# Patient Record
(demographics unavailable — no encounter records)

---

## 2024-10-14 NOTE — HISTORY OF PRESENT ILLNESS
[de-identified] : Patient with h/o ETD, rhinitis, bilateral otitis media, and adenotonsillar hypertrophy presents today with Mom and Dad for a follow up. Mom states that he has been using saline nasal spray and Flonase. Mom states that most of the time he breathes through his mouth and snores. Mom denies apneic episodes. Mom denies that he is tired throughout the day. Mom denies that he tosses and turns at night.

## 2024-10-14 NOTE — ASSESSMENT
[FreeTextEntry1] : Reviewed and reconciled medications, allergies, PMHx, PSHx, SocHx, FMHx.  Patient with h/o ETD, rhinitis, bilateral otitis media, and adenotonsillar hypertrophy presents today with Mom and Dad for a follow up. Mom states that he has been using saline nasal spray and Flonase. Mom states that most of the time he breathes through his mouth and snores. Mom denies apneic episodes. Mom denies that he is tired throughout the day. Mom denies that he tosses and turns at night.   Physical exam: -TM retracted bilaterally -enlarged inferior turbinates bilaterally -large tonsils  Audio 10/14/24: -Type C Tymps AU -responded to speech and tonal stimuli (500 Hz) at slightly elevated levels in at least the better ear -right TPP: -226 -left TPP: -293  Plan:  Audio - results interpreted by Dr. Malave and reviewed with the patient. -Continue Flonase - 1 spray bilaterally QD, spray laterally -Ordered sleep study -Discussed tonsillectomy and adenoidectomy pending results from sleep study. Possible complications including but not limited to infection, pain, bleeding, complications from anesthesia, and need for further surgery were discussed. All questions were answered. -Discussed submucous resection of bilateral inferior turbinates pending results from sleep study. R/b/a discussed. Risk of turbinate surgery were discussed with the patient's Mom and Dad including but not limited to bleeding, infection, septal perforation, empty nose syndrome, numbness of the front two teeth, and loss of sense of smell.  All questions were answered. -FU in 2 months

## 2024-10-14 NOTE — ADDENDUM
[FreeTextEntry1] :  Documented by Bony Hobbs acting as scribe for Dr. Malave on 10/14/2024. All Medical record entries made by the Scribe were at my, Dr. Malave, direction and personally dictated by me on 10/14/2024 . I have reviewed the chart and agree that the record accurately reflects my personal performance of the history, physical exam, assessment and plan. I have also personally directed, reviewed, and agreed with the discharge instructions.

## 2024-10-14 NOTE — CONSULT LETTER
[Dear  ___] : Dear  [unfilled], [Courtesy Letter:] : I had the pleasure of seeing your patient, [unfilled], in my office today. [Please see my note below.] : Please see my note below. [Consult Closing:] : Thank you very much for allowing me to participate in the care of this patient.  If you have any questions, please do not hesitate to contact me. [Sincerely,] : Sincerely, [FreeTextEntry3] :  Mychal Malave MD FACS

## 2024-10-14 NOTE — DATA REVIEWED
[FreeTextEntry1] : Audio 10/14/24: -Type C Tymps AU -responded to speech and tonal stimuli (500 Hz) at slightly elevated levels in at least the better ear -right TPP: -226 -left TPP: -293

## 2024-10-14 NOTE — PHYSICAL EXAM
[Normal] : normal [de-identified] : retracted [de-identified] : retracted [de-identified] : enlarged inferior turbinates [de-identified] : enlarged inferior turbinates [de-identified] : large tonsils

## 2024-12-16 NOTE — ADDENDUM
[FreeTextEntry1] :  Documented by Bony Hobbs acting as scribe for Dr. Malave on 12/16/2024. All Medical record entries made by the Scribe were at my, Dr. Malave, direction and personally dictated by me on 12/16/2024 . I have reviewed the chart and agree that the record accurately reflects my personal performance of the history, physical exam, assessment and plan. I have also personally directed, reviewed, and agreed with the discharge instructions.

## 2024-12-16 NOTE — PHYSICAL EXAM
[Normal] : normal [de-identified] : loud, heavy breathing [de-identified] : fluid in the middle ear [de-identified] : fluid in the middle ear [de-identified] : inflamed and swollen turbinates [de-identified] : inflamed and swollen turbinates [de-identified] : not cooperative to oral exam

## 2024-12-16 NOTE — ASSESSMENT
[FreeTextEntry1] : Reviewed and reconciled medications, allergies, PMHx, PSHx, SocHx, FMHx.  Patient with h/o ETD, rhinitis, bilateral otitis media, bilateral inferior turbinate hypertrophy, loud snoring, and adenotonsillar hypertrophy presents today with Dad for a follow up. Dad states that he is always breathing loudly through his mouth. Dad states that he uses Flonase nasal spray. Dad states that he has a sleep study scheduled for 2/4/24. Dad denies witnessed pauses in his breathing. Dad states that he takes naps during the day. Dad denies that he pulls at his ears. Dad states that he had a fever on Thursday.  Audio 10/14/24: -Type C Tymps AU -responded to speech and tonal stimuli (500 Hz) at slightly elevated levels in at least the better ear -right TPP: -226 -left TPP: -293  Physical exam: -fluid in the middle ear bilaterally -inflamed and swollen turbinates bilaterally -not cooperative to oral exam -loud, heavy breathing  Audio 12/16/24: -tymps type B AU -conductive hearing loss  Plan: Audio - results interpreted by Dr. Malave and reviewed with the patient. -Pending results from sleep study R/B/A of bilaterally myringotomy with tube placement, tonsillectomy, and adenoidectomy with possible SMR turbs discussed with dad. All questions answered.

## 2025-03-12 NOTE — HISTORY OF PRESENT ILLNESS
[de-identified] : Patient with h/o ETD, rhinitis, recurrent bilateral otitis media, bilateral inferior turbinate hypertrophy, loud snoring, heavy breathing, and adenotonsillar hypertrophy presents today with Mom and Dad to discuss results from sleep study. Mom states that he is still mouth breathing. Mom states that he currently has nasal congestion with a runny nose and a cough.

## 2025-03-12 NOTE — PHYSICAL EXAM
[Normal] : normal [4+] : 4+ [de-identified] : fluid filled middle ear [de-identified] : enlarged and swollen turbinates [de-identified] : enlarged and swollen turbinates

## 2025-03-12 NOTE — PHYSICAL EXAM
[Normal] : normal [4+] : 4+ [de-identified] : fluid filled middle ear [de-identified] : enlarged and swollen turbinates [de-identified] : enlarged and swollen turbinates

## 2025-03-12 NOTE — HISTORY OF PRESENT ILLNESS
[de-identified] : Patient with h/o ETD, rhinitis, recurrent bilateral otitis media, bilateral inferior turbinate hypertrophy, loud snoring, heavy breathing, and adenotonsillar hypertrophy presents today with Mom and Dad to discuss results from sleep study. Mom states that he is still mouth breathing. Mom states that he currently has nasal congestion with a runny nose and a cough.

## 2025-03-12 NOTE — ASSESSMENT
[FreeTextEntry1] : Reviewed and reconciled medications, allergies, PMHx, PSHx, SocHx, FMHx.  Patient with h/o ETD, rhinitis, recurrent bilateral otitis media, bilateral inferior turbinate hypertrophy, loud snoring, heavy breathing, and adenotonsillar hypertrophy presents today with Mom and Dad to discuss results from sleep study. Mom states that he is still mouth breathing. Mom states that he currently has nasal congestion with a runny nose and a cough.  Sleep study 2/4/25: -total AHI 2.4 - mild sleep apnea -Obstructive Apnea Index 0.2 -oxygen dropped to 94%  Physical exam: -fluid filled left middle ear -enlarged and swollen turbinates bilaterally -class 4 tonsils  Plan: Sleep study reviewed and discussed with the patient's Mom and Dad. -Discussed tonsillectomy or tonsillotomy and adenoidectomy. Possible complications including but not limited to infection, pain, bleeding, complications from anesthesia, and need for further surgery were discussed. All questions were answered. -Discussed submucous resection of bilateral inferior turbinates. R/b/a discussed. Risk of turbinate surgery were discussed with the patient's Mom and Dad including but not limited to bleeding, infection, septal perforation, empty nose syndrome, numbness of the front two teeth, and loss of sense of smell.  All questions were answered. -left tympanostomy tubes R/b/a discussed. Risks include but are not limited to bleeding, infection, otorrhea, early extrusion of tubes, persistent tympanic membrane perforation, worsening of hearing, need for future surgery. All questions were answered. -Referred to Dr. Mar for surgery -FU as needed

## 2025-03-12 NOTE — DATA REVIEWED
[FreeTextEntry1] : Sleep study 2/4/25: -total AHI 2.4 - mild sleep apnea -Obstructive Apnea Index 0.2 -oxygen dropped to 94%

## 2025-03-12 NOTE — ADDENDUM
[FreeTextEntry1] :  Documented by Bony Hobbs acting as scribe for Dr. Malave on 03/12/2025. All Medical record entries made by the Scribe were at my, Dr. Malave, direction and personally dictated by me on 03/12/2025 . I have reviewed the chart and agree that the record accurately reflects my personal performance of the history, physical exam, assessment and plan. I have also personally directed, reviewed, and agreed with the discharge instructions.

## 2025-03-26 NOTE — CONSULT LETTER
[Dear  ___] : Dear  [unfilled], [Consult Letter:] : I had the pleasure of evaluating your patient, [unfilled]. [Please see my note below.] : Please see my note below. [Consult Closing:] : Thank you very much for allowing me to participate in the care of this patient.  If you have any questions, please do not hesitate to contact me. [Sincerely,] : Sincerely, [FreeTextEntry3] : Brdigett Contreras MD\par  Pediatric Dermatology\par  [facility]\par

## 2025-03-26 NOTE — CONSULT LETTER
[Dear  ___] : Dear  [unfilled], [Consult Letter:] : I had the pleasure of evaluating your patient, [unfilled]. [Please see my note below.] : Please see my note below. [Consult Closing:] : Thank you very much for allowing me to participate in the care of this patient.  If you have any questions, please do not hesitate to contact me. [Sincerely,] : Sincerely, [FreeTextEntry3] : Bridgett Contreras MD\par  Pediatric Dermatology\par  [facility]\par

## 2025-03-26 NOTE — HISTORY OF PRESENT ILLNESS
[FreeTextEntry1] : fu eczema [de-identified] : DEMETRIUS JOHN  is a 1yo M who presents for f/u eczema, continuing to flare despite near daily use of triamcinolone to trunk/extremities. Tried eucrisa a few times and felt it helped. Mom inquiring about dupixent previously with groin rash iso desitin use, mom notes this has resolved  Soap: Dove eczema   Moisturizer: CeraVe, Vaseline, shea butter Detergent: Tide F&C - only for patient, not family members

## 2025-03-26 NOTE — ASSESSMENT
[FreeTextEntry1] : # Atopic dermatitis, chronic, flare # Xerosis Discussed dry skincare including avoidance of fragranced detergents for everyone in the household, switching from Dove eczema wash to Dove sensitive bar Mom interested in starting dupixent START dupixent 200mg q 4 weeks (pt weight 23 lb) - mom feels comfortable injecting at home as she is a nurse - Discussed r/b/a and side effects including but not limited to injection site reactions, hypersensitivity, ocular SEs May use buzzy the bee toy as distractor for injections -Leave out overnight to ensure room temperature at time of injection Start clobetasol 0.05% ointment BID to thicker areas on extremities for up to 2w at a time. SED including atrophy, dyspigmentation, telangiectasias, striae. Proper use reviewed including only using to affected area and avoidance of prolonged use. C/w eucrisa anywhere on the body for maintenance May use triamcinolone for a few days at a time only as needed on the face   RTC 3 mos, may start dupi at home prior to this

## 2025-03-26 NOTE — HISTORY OF PRESENT ILLNESS
[FreeTextEntry1] : fu eczema [de-identified] : DEMETRIUS JOHN  is a 3yo M who presents for f/u eczema, continuing to flare despite near daily use of triamcinolone to trunk/extremities. Tried eucrisa a few times and felt it helped. Mom inquiring about dupixent previously with groin rash iso desitin use, mom notes this has resolved  Soap: Dove eczema   Moisturizer: CeraVe, Vaseline, shea butter Detergent: Tide F&C - only for patient, not family members

## 2025-03-26 NOTE — PHYSICAL EXAM
[FreeTextEntry3] : eczematous plaques scattered on trunk and extremities with lichenified plaques on wrists, dorsal hands, ankles xerosis cutis

## 2025-04-03 NOTE — REVIEW OF SYSTEMS
[Negative] : Heme/Lymph [de-identified] : As per HPI [de-identified] : As per HPI [de-identified] : As per HPI

## 2025-04-03 NOTE — HISTORY OF PRESENT ILLNESS
[No Personal or Family History of Easy Bruising, Bleeding, or Issues with General Anesthesia] : No Personal or Family History of easy bruising, bleeding, or issues with general anesthesia [de-identified] : Today I had the pleasure of seeing DEMETRIUS JOHN. DEMETRIUS is a 2 year boy who presents for: ETD and ED  History was obtained from patient, parent and chart.  Referred by Dr. Malave  PSG 2/4/2025 mild ED. Sleep efficiency 95.3%, oAHI 1.9, REM oAHI 4.7/hr, sandra 94%  Mouth breathing and snoring for about a year, remains the same  Very occasional gasping in sleep.  No daytime symptoms   Intermittent nasal congestion Currently has congestion and cough since yesterday  Has been on Flonase since October with no improvement   No recent ear infections  recurrent effusion on previous audios since last May  No speech or language delay  +Concerns for hearing due to previous audio with low thresholds  Passed [facility]

## 2025-04-03 NOTE — ASSESSMENT
[FreeTextEntry1] : DEMETRIUS is a 2 year old boy presenting for sleep disordered breathing recently started dupixent   Sleep Disordered Breathing, mild ED AHI 1.9 REM AHI 4.7 - Discussed options for observation, medical management, sleep study or surgery.  - offered surgery T&A or Dise Ad possible Tonsils [facility] [place] - current URI - saline lavage discussed  eustachian tube dysfunction some interval improvement no effusion and hearing slightly improved - AUDIO FIRST NEXT VISIT  Education provided: Sleep disordered breathing may indicate presence of Obstructive Sleep Apnea. Obstructive sleep apnea is a condition where there are there is a cessation of breathing due to upper airway obstruction during sleep. It can be caused by a number of anatomic issues including, but not limited to tonsillar hypertrophy, increased weight, nasal obstruction and airway issues. There can be snoring, but this may be normal. Sleep apnea can be suggested by history, but a sleep study is needed to diagnose it. Options include observation and correction of the anatomic abnormality, weight loss if weight is contributory.   Options/risks/benefits for intracapsular vs extracapsular tonsillectomy were discussed with the family.   Drug Induced Sleep Endoscopy, Adenoidectomy possible Tonsillectomy The risks, benefits and alternatives of drug induced sleep endoscopy, adenoidectomy and possible tonsillectomy were discussed.   DISE: Risks including but not limited to pain, bleeding, swelling, infection, scarring damage to lips, teeth, gums, parts of the oral cavity, oropharynx and airway, risk for further procedures, and risk of anesthesia (which will be discussed with you by the anesthesiologist).  Risks of postoperative need for oxygen, positive pressure ventilation or intubation and possible observation and/or hospital admission were discussed.  Risk of possible persistent obstructive sleep apnea discussed. Benefits include the diagnosis or surveillance of an underlying condition and treatment of an underlying condition. Alternatives include observation, and other diagnostic studies. During observation, if there is an underlying condition there is a risk that it could progress. Photodocumentation including pictures and video with or without sound may be taken, and effort will be made to maintain respect for privacy and confidentiality.   Tonsillectomy: The risks of tonsillectomy include but are not limited to: bleeding, which can range from mild requiring observation to more serious or life-threatening bleeding necessitating hospitalization, blood transfusion, and/or return to the operating room for control; voice change, infection, pain, dehydration, swallowing difficulty, need for additional surgery, nasal regurgitation and risk of anesthesia (which will be discussed by the anesthesiologist). Benefits in the case of recurrent tonsillopharyngitis include a reduction (but not necessarily a complete cure) in the number of throat infections, and in the case of obstructive sleep apnea (ED) include a decrease in severity of ED, which can be curative, but in many cases residual ED may occur. Alternatives in the case of recurrent tonsillopharyngitis include observation and continued antibiotic treatment, and in the case of ED observation, medical therapy, Continuous Positive Airway Pressure(CPAP), Bilevel Positive Airway Pressure (BiPAP) other surgical options. Non-treatment of ED is associated with decreased sleep and its sequelae, and in severe cases can have cardiovascular complications.  Adenoidectomy: The risks, benefits and alternatives of adenoidectomy were discussed. The risks include but are not limited to: bleeding, which can range from mild requiring observation to more serious necessitating hospitalization, blood transfusion, return to the operating room for control and in extreme cases death; voice change- specifically velopharyngeal insufficiency which can affect the nasal resonance; infection, pain, dehydration, swallowing difficulty, need for additional surgery, nasal regurgitation and risk of anesthesia (which will be discussed by the anesthesiologist). Benefits in the case of recurrent adenoiditis include a reduction (but not necessarily a complete cure) in the number of adenoid infections; in the case of nasal obstruction an improvement of nasal airway and decreased rhinorrhea; and in the case of obstructive sleep apnea (ED) include a decrease in severity of ED, which can be curative, but in many cases residual ED may occur. Alternatives in the case of recurrent adenoiditis include observation and continued antibiotic treatment; in the case of nasal obstruction observation or medical therapy including but not limited to antihistamines, intranasal/systemic steroids; and in the case of ED observation, medical therapy, Continuous Positive Airway Pressure(CPAP), Bilevel Positive Airway Pressure (BiPAP) other surgical options. Non-treatment of ED is associated with decreased sleep and its sequelae, and in severe cases can have cardiovascular complications.

## 2025-04-03 NOTE — CONSULT LETTER
[Dear  ___] : Dear  [unfilled], [Courtesy Letter:] : I had the pleasure of seeing your patient, [unfilled], in my office today. [Please see my note below.] : Please see my note below. [Consult Closing:] : Thank you very much for allowing me to participate in the care of this patient.  If you have any questions, please do not hesitate to contact me. [Sincerely,] : Sincerely, [FreeTextEntry2] : Dr. Chen Parks  [address] [id number] [phone] [FreeTextEntry3] : Chiara Mar MD Pediatric Otolaryngology / Head and Neck Surgery    [facility] [address] [place] [address] Tel [phone] Fax [phone]    [facility] [address] [place] [address]  Tel [phone] Fax [phone]

## 2025-04-03 NOTE — PHYSICAL EXAM
[Normal Gait and Station] : normal gait and station [Normal muscle strength, symmetry and tone of facial, head and neck musculature] : normal muscle strength, symmetry and tone of facial, head and neck musculature [Normal] : no cervical lymphadenopathy [Effusion] : no effusion [Exposed Vessel] : left anterior vessel not exposed [Increased Work of Breathing] : no increased work of breathing with use of accessory muscles and retractions [de-identified] : 2-3+

## 2025-06-24 NOTE — ASSESSMENT
[Use of independent historian: [ enter independent historian's relationship to patient ] :____] : As the patient was unable to provide a complete and reliable history, I obtained clinical history from the patient's [unfilled] [FreeTextEntry1] : # Atopic dermatitis, chronic, stable on dupixent # Xerosis Discussed dry skincare including avoidance of fragranced detergents for everyone in the household  CONTINUE dupixent 200mg q 4 weeks (pt weight 23 lb) - mom feels comfortable injecting at home as she is a nurse. Switch from PFS to pen. - Discussed r/b/a and side effects including but not limited to injection site reactions, hypersensitivity, ocular SEs May use buzzy the bee toy as distractor for injections -Leave out overnight to ensure room temperature at time of injection C/w clobetasol 0.05% ointment BID to thicker areas on extremities PRN FLARES for up to 2w at a time. SED including atrophy, dyspigmentation, telangiectasias, striae. Proper use reviewed including only using to affected area and avoidance of prolonged use. C/w eucrisa anywhere on the body for maintenance May use triamcinolone for a few days at a time only as needed on the face   #Post inflammatory hyperpigmentation - due to atopic dermatitis Discussed that PIH will take time to resolve after eczema is under control  #Encounter for medication monitoring -Switch Dupixent formulation from PFS to autoinjector per mother's preference -Reviewed instructions for autoinjector administration RTC 6 mos

## 2025-06-24 NOTE — HISTORY OF PRESENT ILLNESS
[FreeTextEntry1] : fu eczema [de-identified] : DEMETRIUS JOHN  is a 3yo M who presents for f/u eczema  Started on dupixent at  April 2025 with great improvement. Injecting 200mg q4 weeks. Sparingly needing to use topicals (clobetasol) PRN flares Of note the injection leads to some difficulty with administration due the large size of the needle. Using PFS.  Soap: Dove eczema, eczema honey  Moisturizer: Vaseline, eczema honey Detergent: Tide F&C

## 2025-06-24 NOTE — CONSULT LETTER
[Dear  ___] : Dear  [unfilled], [Consult Letter:] : I had the pleasure of evaluating your patient, [unfilled]. [Please see my note below.] : Please see my note below. [Consult Closing:] : Thank you very much for allowing me to participate in the care of this patient.  If you have any questions, please do not hesitate to contact me. [Sincerely,] : Sincerely, [FreeTextEntry3] : Bishop Sanders MD Pediatric Dermatology [facility]

## 2025-07-25 NOTE — REVIEW OF SYSTEMS
[Negative] : Heme/Lymph [de-identified] : As per HPI [de-identified] : As per HPI [de-identified] : As per HPI

## 2025-07-25 NOTE — REASON FOR VISIT
[Subsequent Evaluation] : a subsequent evaluation for [Mother] : mother [FreeTextEntry2] :  ear infections, nasal obstruction and sleep apnea/ snoring.

## 2025-07-25 NOTE — PHYSICAL EXAM
[Effusion] : no effusion [Exposed Vessel] : left anterior vessel not exposed [3+] : 3+ [Increased Work of Breathing] : no increased work of breathing with use of accessory muscles and retractions [Normal Gait and Station] : normal gait and station [Normal muscle strength, symmetry and tone of facial, head and neck musculature] : normal muscle strength, symmetry and tone of facial, head and neck musculature [Normal] : no cervical lymphadenopathy

## 2025-07-25 NOTE — HISTORY OF PRESENT ILLNESS
[de-identified] : Today I had the pleasure of seeing DEMETRIUS JOHN for follow up of  ear infections, nasal obstruction and sleep apnea/ snoring. History was obtained from patient, mother and chart. PSG 2/4/2025 mild ED. Sleep efficiency 95.3%, oAHI 1.9, REM oAHI 4.7/hr, sandra 94% [de-identified] : Improvement in mouth breathing.  Continues snoring and having nasal congestion Taking zyrtec and dupixent No nasal sprays  No recent ear infections Saw pediatrician recently who did not see fluid in his ears.

## 2025-07-25 NOTE — CONSULT LETTER
[Dear  ___] : Dear  [unfilled], [Courtesy Letter:] : I had the pleasure of seeing your patient, [unfilled], in my office today. [Please see my note below.] : Please see my note below. [Consult Closing:] : Thank you very much for allowing me to participate in the care of this patient.  If you have any questions, please do not hesitate to contact me. [Sincerely,] : Sincerely, [FreeTextEntry2] : Dr. Chen Parks  [address] [id number] [phone] [FreeTextEntry3] : Chiara Mar MD Pediatric Otolaryngology / Head and Neck Surgery    [facility] [address] [place] [address] Tel [phone] Fax [phone]    [address] [facility] [address] [place] [address]  Tel [phone] Fax [phone]

## 2025-07-25 NOTE — ASSESSMENT
[FreeTextEntry1] : DEMETRIUS is a 2 year old boy presenting for sleep disordered breathing on dupixent since Spring 2025  Sleep Disordered Breathing, mild ED AHI 1.9 REM AHI 4.7, moderate/severe symptoms - Discussed options for observation, medical management, sleep study or surgery.  - offered [facility] [place] [facility] age (PST due to need for admission) - flonase trial   eustachian tube dysfunction improving, AD A AS C SDT 20 soundfield 20-15  Education provided: Sleep disordered breathing may indicate presence of Obstructive Sleep Apnea. Obstructive sleep apnea is a condition where there are there is a cessation of breathing due to upper airway obstruction during sleep. It can be caused by a number of anatomic issues including, but not limited to tonsillar hypertrophy, increased weight, nasal obstruction and airway issues. There can be snoring, but this may be normal. Sleep apnea can be suggested by history, but a sleep study is needed to diagnose it. Options include observation and correction of the anatomic abnormality, weight loss if weight is contributory.   Consent for Tonsillectomy and Adenoidectomy The risks, benefits and alternatives of tonsillectomy and adenoidectomy were discussed.   The risks of tonsillectomy include but are not limited to: bleeding, which can range from mild requiring observation to more serious or life-threatening bleeding necessitating hospitalization, blood transfusion, and/or return to the operating room for control; voice change, infection, pain, dehydration, swallowing difficulty, need for additional surgery, nasal regurgitation and risk of anesthesia (which will be discussed by the anesthesiologist). Benefits in the case of recurrent tonsillopharyngitis include a reduction (but not necessarily a complete cure) in the number of throat infections, and in the case of obstructive sleep apnea (ED) include a decrease in severity of ED, which can be curative, but in many cases residual ED may occur. Alternatives in the case of recurrent tonsillopharyngitis include observation and continued antibiotic treatment, and in the case of ED observation, medical therapy, Continuous Positive Airway Pressure(CPAP), Bilevel Positive Airway Pressure (BiPAP) other surgical options. Non-treatment of ED is associated with decreased sleep and its sequelae, and in severe cases can have cardiovascular complications.  The risks, benefits and alternatives of adenoidectomy were discussed. The risks include but are not limited to: bleeding, which can range from mild requiring observation to more serious necessitating hospitalization, blood transfusion, return to the operating room for control and in extreme cases death; voice change- specifically velopharyngeal insufficiency which can affect the nasal resonance; infection, pain, dehydration, swallowing difficulty, need for additional surgery, nasal regurgitation and risk of anesthesia (which will be discussed by the anesthesiologist). Benefits in the case of recurrent adenoiditis include a reduction (but not necessarily a complete cure) in the number of adenoid infections; in the case of nasal obstruction an improvement of nasal airway and decreased rhinorrhea; and in the case of obstructive sleep apnea (ED) include a decrease in severity of ED, which can be curative, but in many cases residual ED may occur. Alternatives in the case of recurrent adenoiditis include observation and continued antibiotic treatment; in the case of nasal obstruction observation or medical therapy including but not limited to antihistamines, intranasal/systemic steroids; and in the case of ED observation, medical therapy, Continuous Positive Airway Pressure(CPAP), Bilevel Positive Airway Pressure (BiPAP) other surgical options. Non-treatment of ED is associated with decreased sleep and its sequelae, and in severe cases can have cardiovascular complications.   Options/risks/benefits for intracapsular vs extracapsular tonsillectomy were discussed with the family.